# Patient Record
Sex: MALE | Race: WHITE | NOT HISPANIC OR LATINO | Employment: FULL TIME
[De-identification: names, ages, dates, MRNs, and addresses within clinical notes are randomized per-mention and may not be internally consistent; named-entity substitution may affect disease eponyms.]

---

## 2023-12-09 ENCOUNTER — HEALTH MAINTENANCE LETTER (OUTPATIENT)
Age: 33
End: 2023-12-09

## 2023-12-19 ENCOUNTER — VIRTUAL VISIT (OUTPATIENT)
Dept: ENDOCRINOLOGY | Facility: CLINIC | Age: 33
End: 2023-12-19
Payer: COMMERCIAL

## 2023-12-19 ENCOUNTER — TELEPHONE (OUTPATIENT)
Dept: ENDOCRINOLOGY | Facility: CLINIC | Age: 33
End: 2023-12-19

## 2023-12-19 VITALS — BODY MASS INDEX: 34.67 KG/M2 | HEIGHT: 72 IN | WEIGHT: 256 LBS

## 2023-12-19 DIAGNOSIS — E66.811 CLASS 1 OBESITY WITH SERIOUS COMORBIDITY AND BODY MASS INDEX (BMI) OF 34.0 TO 34.9 IN ADULT, UNSPECIFIED OBESITY TYPE: Primary | ICD-10-CM

## 2023-12-19 DIAGNOSIS — E66.811 CLASS 1 OBESITY WITH SERIOUS COMORBIDITY AND BODY MASS INDEX (BMI) OF 34.0 TO 34.9 IN ADULT, UNSPECIFIED OBESITY TYPE: ICD-10-CM

## 2023-12-19 DIAGNOSIS — E78.1 HYPERTRIGLYCERIDEMIA: ICD-10-CM

## 2023-12-19 DIAGNOSIS — Z71.3 NUTRITIONAL COUNSELING: Primary | ICD-10-CM

## 2023-12-19 PROCEDURE — 97802 MEDICAL NUTRITION INDIV IN: CPT | Mod: 95 | Performed by: DIETITIAN, REGISTERED

## 2023-12-19 PROCEDURE — 99205 OFFICE O/P NEW HI 60 MIN: CPT | Mod: 95 | Performed by: NURSE PRACTITIONER

## 2023-12-19 PROCEDURE — 99207 PR NO CHARGE LOS: CPT | Mod: 95 | Performed by: DIETITIAN, REGISTERED

## 2023-12-19 RX ORDER — BUSPIRONE HYDROCHLORIDE 15 MG/1
15 TABLET ORAL 2 TIMES DAILY
COMMUNITY
Start: 2023-02-28

## 2023-12-19 RX ORDER — SERTRALINE HYDROCHLORIDE 100 MG/1
150 TABLET, FILM COATED ORAL DAILY
COMMUNITY
Start: 2018-04-01

## 2023-12-19 ASSESSMENT — PAIN SCALES - GENERAL: PAINLEVEL: NO PAIN (0)

## 2023-12-19 NOTE — PATIENT INSTRUCTIONS
Michael Ruiz,    Follow-up with RD in 6 weeks.     Thank you,    Rossy Jackson, RD, LD  If you would like to schedule or reschedule an appointment with the RD, please call [phone]    Nutrition Goals  1) Work towards having 3 balanced meals daily (away from grazing pattern of eating at work, and more consistent daytime eating on days off).   2) Avoid eating in 2-3 hours before bed.   3) Integrate resistance exercise, 15-20 mins 2-3 times per week.      The Plate Method:  Plate method can be used for general guidance on balanced meals/portion sizes (see link below for picture/more information)                 Make 1/2 your plate non starchy vegetables (cauliflower, broccoli, asparagus, [place] sprouts, lettuce, carrots, for example).                3+ oz lean protein sources (salmon/skinless chicken/turkey breast/pork loin/lean cuts of beef/ or non-animal protein such as black, kidney or stout beans, tofu/edamame/tempeh)    (Note: 3 oz = deck of cards size)                1/2 to 1 cup carbohydrate choices such as whole grain starches or starchy vegetables or fruit. For example: quinoa, brown rice, barley, potatoes, sweet potatoes, winter squash, peas, corn, or fruit.                Choose ~0-2 added fat servings at a meal (avocados, nut butters, nuts or seeds, olive oil, vegetable oils).    [email or web address]    Protein Sources   [email or web address]     Carbohydrates  [email or web address]     Mindful Eating  [email or web address]://[email or web address]     Summary of Volumetrics Eating Plan  [email or web address]://[email or web address]     At Home Exercise Resources  ACE Fitness - Exercises by Muscle Group  https://www.acefitness.org/resources/everyone/exercise-library/    Plus Size Beginner Workout - Standing (low impact)  https://www.Visual Realmube.com/watch?v=8DyVIEe5JOM      Plus Size Full Body (created by a woman who struggled to find videos that were inclusive to larger bodies so she decided to make her own!)    https://www.Conversion Logicube.com/watch?v=6NzYT2y-lm0     Standing Core Workout for Beginners   https://www.Conversion Logicube.com/watch?v=O05y0qhhe73     Dance Workouts: The Fitness Srikanth   https://www.AllazoHealth.BuyBox/user/Mery    HIIT Workouts: PopSugar Fitness  https://www.Conversion Logicube.BuyBox/user/popsugartvfit    Pilates: Blogilates  https://www.AllazoHealth.BuyBox/user/blogilates    Yoga: Yoga with Sandrine   https://www.AllazoHealth.BuyBox/user/yogawithadriene/featured    Strength Training: HASfit  https://www.AllazoHealth.com/channel/ARTVN9-YFBCj74YW-h8LLguY    Handouts Relating to Exercise:    1.     Learning About Being Physically Active     2.      Muscle Conditionin Exercises    3.     Resistance Training with Free Weights: 3 Exercises    4.      Resistance Training with Surgical Tubin Exercises    5.     Stretchin Exercises    6.     Seated Exercises for Arms and Legs: 11 Exercises      COMPREHENSIVE WEIGHT MANAGEMENT PROGRAM  VIRTUAL SUPPORT GROUPS    At [facility], our Comprehensive Weight Management program offers on-line support groups for patients who are working on weight loss and considering, preparing for, or have had weight loss surgery.     There is no cost for this opportunity.  You are invited to attend the?Virtual Support Groups?provided by any of the following locations:    [facility] via [employer] Teams with Erum Noriega RN  2.   [place] via Strike[employer] Teams with Marques Powell, PhD, LP  3.   [place] via [employer] with Rebecca Hussein RN  4.   [facility] via a Zoom Meeting with NUVIA Smith    The following Support Group information can also be found on our website:  https://www.[email or web address].org/treatments/weight-loss-and-weight-loss-surgery-support-groups      [facility] Weight Loss Surgery Support Group  The support group is a patient-lead forum that meets monthly to share experiences, encouragement and education. It is open to those who have had weight  "loss surgery, are scheduled for surgery, or are considering surgery.   WHEN: This group meets on the 3rd Wednesday of each month from 5:00PM - 6:00PM virtually using Microsoft Teams.   FACILITATOR: Led by Erum Harrison RD, LD, RN, the program's Clinical Coordinator.   TO REGISTER: Please contact the clinic via Finexkap or call the nurse line directly at [phone] to inform our staff that you would like an invite sent to you and to let us know the email you would like the invite sent to. Prior to the meeting, a link with directions on how to join the meeting will be sent to you.    2023 and 2024 Meetings   December 20  January 17  February 21  March 20  April 17  May 15  Bernice 19      [facility] Bariatric Care Support Group?  This is open to all pre- and post- operative bariatric surgery patients as well as their support system.   WHEN: This group meets the 3rd Tuesday of each month from 6:30 PM - 8:00 PM virtually using Microsoft Teams.   FACILITATOR: Led by Marques Powell, Ph.D who is a Licensed Psychologist with the [facility] Comprehensive Weight Management Program.   TO REGISTER: Please send an email to Marques Powell, Ph.D., LP at?[email or web address]?if you would like an invitation to the group. Prior to the meeting, a link with directions on how to join the meeting will be sent to you.    2023 and 2024 Meetings  December 19 January 16: \"Medication Management and Bariatric Surgery\", Kesha Hidalgo, PharmD, Pharmacy Resident at [facility]  February 20: \"A Bariatric Surgery Patient's Perspective\", SHELLY [place], [facility], Behavioral Health Clinician at [facility]  March 19  April 16  May 21  Bernice 18: \"Nutritional Labeling\", Dietitian speaker to be announced.  November 19: \"Holiday Eating\", Dietitian speaker to be announced.    [facility] " "Post-Operative Bariatric Surgery Support Group  This is a support group for [facility] bariatric patients (and those external to [facility]) who have had bariatric surgery and are at least 3 months post-surgery.  WHEN: This support group meets the 4th Wednesday of the month from 11:00 AM - 12:00 PM virtually using Microsoft Teams.   FACILITATOR: Led by Certified Bariatric Nurse, Rebecca Hussein RN.   TO REGISTER: Please send an email to Rebecca at [email or web address] if you would like an invitation to the group.  Prior to the meeting, a link with directions on how to join the meeting will be sent to you.    2023 and 2024 Meetings  December 27  January 24  February 28  March 27  April 24  May 22  Bernice 26    [facility] Healthy Lifestyle Group?  This is a 60 minute virtual coaching group for those who want to lead a healthier lifestyle. Come together to set goals and overcome barriers in a supportive group environment. We will address the four pillars of health: nutrition, exercise, sleep and emotional well-being.  This group is highly recommended for those who are participating in the 24 week Healthy Lifestyle Plan and our Health Coaching sessions.  WHEN: This group meets the 1st Friday of the month, 12:30 PM - 1:30 PM online, via a zoom meeting.    FACILITATOR: Led by National Board Certified Health and , Rebecca Hurst [facility].   TO REGISTER: Please call the Call Center at [phone] to register.  You will get an appointment to attend in [facility]. Fifteen minutes prior to the meeting, complete the e-check in and you will get the link to join the meeting.    There is no charge to attend this group and space is limited.     2023 and 2024 Meetings  December 1: \"Let's Talk\" (guided discussion on our wins and challenges)  January 5: \"New Years Vision: Manifest your Best 2024!\" (guided imagery,  journaling and discussion)  February 2: \"Let's " "Talk\"  March 1: \"10 Percent Happier\" by Anand Gupta (Book Bites - a guided discussion on the nuggets of wisdom from favorite wellness books, no need to read the book but highly encouraged)  April 5: \"Let's Talk\"  May 3: \"Essentialism: The Disciplined Pursuit of Less\" by Weston Jang (Book Bites discussion)  June 7: \"Let's Talk\"  July 5: NO MEETING, off for the 4th of July Holiday  August 2: \"The Blue Zones, Secrets for Living a Longer Life\" by Anand Castano (Book Bites discussion)                    "

## 2023-12-19 NOTE — PROGRESS NOTES
Virtual Visit Details    Type of service:  Video Visit     Originating Location (pt. Location): Home    Distant Location (provider location):  On-site  Platform used for Video Visit: Pascual

## 2023-12-19 NOTE — PATIENT INSTRUCTIONS
"Thank you for allowing us the privilege of caring for you. We hope we provided you with the excellent service you deserve.   Please let us know if there is anything else we can do for you so that we can be sure you are completely satisfied with your care experience.    To ensure the quality of our services you may be receiving a patient satisfaction survey from an independent patient satisfaction monitoring company.    The greatest compliment you can give is a \"Likely to Recommend\"    Your visit was with Karishma Michaud CNP, and Joana Arnold PA-C today.    Instructions per today's visit:     Michael العلي, it was great to visit with you today.  Here is a review of our visit.  If our clinic scheduler is not able to reach you please call [phone] to schedule your next appointments.    Plan  Start Zepbound 2.5mg once weekly for 4 weeks, then increase to 5mg once weekly.  If zepbound denied, will prescribe saxenda as alternative   Goals we discussed today: cutting down on daily alcohol, adding weight training in each week   Labs ordered today: can get these done at any [place] lab  Follow up with Joana in 3 months   Dietician appointment today       Information about Video Visits with [facility]: video visit information  _________________________________________________________________________________________________________________________________________________________  If you are asked by your clinic team to have your blood pressure checked:  [facility] do offer several locations for blood pressure checks. Please follow the below link to schedule an appointment. Scheduling an appointment at the pharmacy for a blood pressure check is now preferred.    Appointment Plus (appointment-plus.State)  _________________________________________________________________________________________________________________________________________________________  Important contact and scheduling " information:  Please call our contact center at [phone] to schedule your next appointments.  To find a lab location near you, please call [phone].  For any nursing questions or concerns call Rosaura Grant LPN at [phone] or Dayan Scott RN at [phone]  Please call during clinic hours Monday through Friday 8:00a - 4:00p if you have questions or you can contact us via HeTextedhart at anytime and we will reply during clinic hours.    Lab results will be communicated through My Chart or letter (if My Chart not used). Please call the clinic if you have not received communication after 1 week or if you have any questions.?  Clinic Fax: [phone]    _________________________________________________________________________________________________________________________________________________________  Meal Replacement Products:    Here is the link to our new e-store where you can purchase our meal replacement products    [facility] E-Store  [employer] [place] [employer].Omnidrive/store    The one week starter kit is a great way to sample a variety of products and see what works for you.    If you want more information about the product go to: [facility]    If you are an employee or [facility] or [facility] please contact your care team for a 10% estore discount    Free Shipping for orders over $75     Benefits of meal replacements products:    Portion and calorie control  Improved nutrition  Structured eating  Simplified food choices  Avoid contact with trigger foods  _________________________________________________________________________________________________________________________________________________________  Interested in working with a health ?  Health coaches work with you to improve your overall health and wellbeing.  They look at the whole person, and may involve discussion of different areas of life, including, but not limited to the four  pillars of health (sleep, exercise, nutrition, and stress management). Discuss with your care team if you would like to start working a health .  Health Coaching-3 Pack: Schedule by calling [phone]    $99 for three health coaching visits    Visits may be done in person or via phone    Coaching is a partnership between the  and the client; Coaches do not prescribe or diagnose    Coaching helps inspire the client to reach his/her personal goals   _________________________________________________________________________________________________________________________________________________________  24 Week Healthy Lifestyle Plan:    Our mission in the 24-week Healthy Lifestyle Plan is to provide you with individualized care by giving you the tools, education and support you need to lose weight and maintain a healthy lifestyle. In your 24-week journey, you ll be supported by a dedicated weight loss team that includes registered dietitians, medical weight management providers, health coaches, and nurses -- all with special expertise in weight loss -- to help you every step of the way.     Monthly meetings with your registered dietician or medical weight management provider help to review your progress, update your care plan, and make any adjustments needed to ensure success. Between these visits, weekly and bi-weekly health  visits will help you focus on the four pillars of weight loss -- stress, sleep, nutrition, and exercise -- and how you can best adapt each to achieve sustainable weight loss results.    In addition, you will be given exclusive access to online wellbeing classes through [employer].  Your initial visit will be with a medical weight management provider who will help to understand your weight loss goals and ensure this program is the right fit for you. Please let our team know if you are interested in the 24 week plan by sending a message to your care team or calling [phone] to  schedule.  _________________________________________________________________________________________________________________________________________________________  __________  [place] [facility] Get Moving Program  Our team of physical therapists is trained to help you understand and take control of your condition. They will perform a thorough evaluation to determine your ability for activity and develop a customized plan to fit your goals and physical ability.  Scheduling: Unsure if the Get Moving program is right for you? Discuss the program with your medical provider or diabetes educator. You can also call us at [phone] to ask questions or schedule an appointment.   [facility] Get Moving Program  ____________________________________________________________________________________________________________________________________________________________________________   [employer] Diabetes Prevention Program (DPP)  If you have prediabetes and Medicare please contact us via NeXeptiont to learn more about the Diabetes Prevention Program (DPP)  Program Details:   [employer] offers the year-long Diabetes Prevention Program (DPP). The program helps you to make lifestyle changes that prevent or delay type 2 diabetes by supporting healthy eating, increased physical activity, stress reduction and use of coping skills.   On average, previous [facility] DPP cohorts have lost and maintained at least 5% of their starting weight throughout the program and averaged more than 150 minutes of physical activity per week.  Participants meet weekly for one-hour group sessions over sixteen weeks, every other week for the next 8 weeks, and monthly for the last six months.   A year-long maintenance program is also available for participants who complete the first year.   Location & Cost:   During the COVID-19 Public Health Emergency, the program is offered virtually. When in-person classes can resume, they  will be held at [facility].  For people with Medicare, the program is covered in full. A self-pay option will also be available for those with non-Medicare insurance plans.   ______________________________________________________________________________________________________________________________________________________________________________________________________________________________    To work with a Behavioral Health Psychologist:    Call to schedule:    Mario Sims - [phone]  Orlando Farris - [phone]  Amaya Cade - [phone]  Sandra Zambrano - [phone]   Haritha Acuna PhD (cannot accept Medicare) [phone]        Thank you,   [facility] Comprehensive Weight Management Team

## 2023-12-19 NOTE — TELEPHONE ENCOUNTER
PA Initiation    Medication: ZEPBOUND 2.5 MG/0.5ML SC SOAJ  Insurance Company: [employer] Non-Specialty PA's - Phone [phone] Fax [phone]  Pharmacy Filling the Rx: [facility] - [place], [place] - [address] [address]  Filling Pharmacy Phone: [phone]  Filling Pharmacy Fax: [phone]  Start Date: 12/19/2023     Thank you,     Gregg Chang [facility]  Pharmacy [facility]  [email or web address]   Phone: [phone]  Fax: [phone]

## 2023-12-19 NOTE — PROGRESS NOTES
"72 minutes spent by me on the date of the encounter doing chart review, history and exam, documentation and further activities per the note    New Medical Weight Management Consult    PATIENT:  Joseph العلي  MRN:         [id number]  :         1990  MINAL:         2023    Dear Primary Care Provider,    I had the pleasure of seeing your patient, Joseph العلي. Full intake/assessment was done to determine barriers to weight loss success and develop a treatment plan. Joseph العلي is a 33 year old male interested in treatment of medical problems associated with excess weight. He has a height of 6' 0\", a weight of 256 lbs 0 oz, and the calculated Body mass index is 34.72 kg/m .            Assessment & Plan   Problem List Items Addressed This Visit       Class 1 obesity with serious comorbidity and body mass index (BMI) of 34.0 to 34.9 in adult, unspecified obesity type - Primary    Relevant Medications    tirzepatide-Weight Management (ZEPBOUND) 2.5 MG/0.5ML prefilled pen    tirzepatide-Weight Management (ZEPBOUND) 5 MG/0.5ML prefilled pen    Other Relevant Orders    CBC with platelets    Comprehensive metabolic panel    Vitamin D Deficiency    Hypertriglyceridemia    Overweight onset in high school, weighed around 170 in high school. Weight gain has been gradual. Was able to lose from 210 to 180 around age 23 with skipping meals, but this was not sustainable and has since regained and has continued to regain to 271lbs 10 months ago which was his highest weight in life. Has since lost weight down to 156 by with diet changes but he has plateaued and has been struggling to keep up diet changes.      Regarding eating: is able to get full, is able to stay full, craves salty foods/meat/hamburgers, emotionally eats, stress eats. Sometimes will eat to the place where he feels uncomfortably full.   Has 2-3 meals a day, breakfast and lunch are small, dinner is generally really big and he will continue to " snack until bed time.     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 When not doing this job, he uses [employer] 2-3 times a week.   Goals with our clinic: manage weight sustainably to improve blood sugar, improve lipid labs.   He has not taken weight loss medication before.   Of note, his wife has seen great weight loss with wegovy, he is interested in starting a GLP-1 today as a tool for managing his cravings, his appetite, and in reducing snacking.       Plan  Start Zepbound 2.5mg once weekly for 4 weeks, then increase to 5mg once weekly.  If zepbound denied, okay to start saxenda as alternative   Goals we discussed today: cutting down on daily alcohol, adding weight training in each week   Labs ordered today: can get these done at any [facility]  Follow up with Joana in 3 months   Dietician appointment today     Medication prescribed   ZEPBOUND  Not concurrently taking a different GLP-1   No history of pancreatitis   No personal or family history of medullary thyroid carcinoma  No personal or family history of Multiple Endocrine Neoplasia Type 2  .    Alternative Medications discussed   SAXENDA  Not concurrently taking a different GLP-1   No history of pancreatitis   No personal or family history of medullary thyroid carcinoma  No personal or family history of Multiple Endocrine Neoplasia Type 2  .    TOPIRAMATE  No history of kidney stones  No history of glaucoma   No issues with memory  Caution would be needed with this medication due to depression managed by psychiatry, would need to monitor closely   .    Relatively contraindicated medications   PHENTERMINE  Issues with anxiety   History of cardiac arrhythmias remote hx of SVT and currently drinks multiple energy drinks daily, may consider with cardiology consult   .  NALTREXONE  Frequent alcohol use   .  BUPROPION  Cardiac  "arrhythmias remote hx of SVT, currently drinks multiple energy drinks daily, also is on successful mental health regimen.    Issues with anxiety, would need to speak with psychiatry   .       Joseph العلي is a 33 year old male who presents to clinic today for the following health issues.     He has the following co-morbidities:        12/19/2023    10:19 AM   --   I have the following health issues associated with obesity Pre-Diabetes   I have the following symptoms associated with obesity Depression    Back Pain    Fatigue    Hip Pain      Triglycerides 2/28/2023: 423            No data to display                    12/19/2023    10:19 AM   Referring Provider   Please name the provider who referred you to Medical Weight Management  If you do not know, please answer \"I Don't Know\" I don't know       Overweight onset in high school, weighed around 170 in high school. Weight gain has been gradual. Was able to lose from 210 to 180 around age 23 with skipping meals, but this was not sustainable and has since regained and has continued to regain to 271lbs 10 months ago which was his highest weight in life. Has since lost weight down to 156 by with diet changes but these have been unsustainable.      Was able to cut out beer to lose weight, has since started drinking on average 9 beers a week.     Goals with our clinic: manage weight sustainably to improve blood sugar, improve lipid labs.     Past/ Current AOMs: none     Wife has seen great weight loss with wegovy.         Regarding eating: is able to get full, is able to stay full, craves salty foods/meat/hamburgers, emotionally eats, stress eats. Sometimes will eat to the place where he feels uncomfortably full.     Has 2-3 meals a day, breakfast and lunch are small, dinner is generally really big and he will continue to snack until bed time.     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 Uses [employer] 2-3 times a week. Works nights as an ED tech, is " on feet for that.             12/19/2023    10:19 AM   Weight History   How concerned are you about your weight? Very Concerned   I became overweight As a Teenager   The following factors have contributed to my weight gain Mental Health Issues    After Quitting Smoking    Eating Wrong Types of Food    Eating Too Much    Stress   I have tried the following methods to lose weight Watching Portions or Calories    Exercise    Meal Replacements   My lowest weight since age 16 was 165   My highest weight since age 16 was 271   The most weight I have ever lost was (lbs) 30   I have the following family history of obesity/being overweight My mother is overweight    My father is overweight   How has your weight changed over the last year? Gained           12/19/2023    10:19 AM   Diet Recall Review with Patient   If you do eat supper, what types of food do you typically eat? Pastas. Red meat   If you do snack, what types of food do you typically eat? Breads   How many glasses of juice do you drink in a typical day? 0   How many of glasses of milk do you drink in a typical day? 0   How many 8oz glasses of sugar containing drinks such as Solo-Aid/sweet tea do you drink in a day? 0   How many cans/bottles of sugar pop/soda/tea/sports drinks do you drink in a day? 0   How many cans/bottles of diet pop/soda/tea or sports drink do you drink in a day? 0   How often do you have a drink of alcohol? Daily   If you do drink, how many drinks might you have in a day? 3-4   Eats really light breakfast and lunch, feel that he overeats for his last meal (dinner on days he doesn't work, mornings on nights he does work).         12/19/2023    10:19 AM   Eating Habits   Generally, my meals include foods like these bread, pasta, rice, potatoes, corn, crackers, sweet dessert, pop, or juice A Few Times a Week   Generally, my meals include foods like these fried meats, brats, burgers, french fries, pizza, cheese, chips, or ice cream A Few Times a  Week   Eat fast food (like McDonalds, Burger Kal, Taco Bell) Once a Week   Eat at a buffet or sit-down restaurant Less Than Weekly   Eat most of my meals in front of the TV or computer Once a Week   Often skip meals, eat at random times, have no regular eating times A Few Times a Week   Rarely sit down for a meal but snack or graze throughout A Few Times a Week   Eat extra snacks between meals A Few Times a Week   Eat most of my food at the end of the day Almost Everyday   Eat in the middle of the night or wake up at night to eat A Few Times a Week   Eat extra snacks to prevent or correct low blood sugar Never   Eat to prevent acid reflux or stomach pain Never   Worry about not having enough food to eat Never   I eat when I am depressed A Few Times a Week   I eat when I am stressed A Few Times a Week   I eat when I am bored A Few Times a Week   I eat when I am anxious Never   I eat when I am happy or as a reward A Few Times a Week   I feel hungry all the time even if I just have eaten Almost Everyday   Feeling full is important to me Everyday   I finish all the food on my plate even if I am already full Almost Everyday   I can't resist eating delicious food or walk past the good food/smell Less Than Weekly   I eat/snack without noticing that I am eating A Few Times a Week   I eat when I am preparing the meal Once a Week   I eat more than usual when I see others eating Never   I have trouble not eating sweets, ice cream, cookies, or chips if they are around the house Less Than Weekly   I think about food all day Almost Everyday   What foods, if any, do you crave? Cheese           12/19/2023    10:19 AM   Amount of Food   I feel out of control when eating Monthly   I eat a large amount of food, like a loaf of bread, a box of cookies, a pint/quart of ice cream, all at once Never   I eat a large amount of food even when I am not hungry Monthly   I eat rapidly Weekly   I eat alone because I feel embarrassed and do not  want others to see how much I have eaten Weekly   I eat until I am uncomfortably full Weekly   I feel bad, disgusted, or guilty after I overeat Almost Everyday           12/19/2023    10:19 AM   Activity/Exercise History   How much of a typical 12 hour day do you spend sitting? Less Than Half the Day   How much of a typical 12 hour day do you spend lying down? Less Than Half the Day   How much of a typical day do you spend walking/standing? Most of the Day   How many hours (not including work) do you spend on the TV/Video Games/Computer/Tablet/Phone? 2-3 Hours   How many times a week are you active for the purpose of exercise? 2-3 Times a Week   What keeps you from being more active? Lack of Time    Unsure What To Do   How many total minutes do you spend doing some activity for the purpose of exercising when you exercise? 15-30 Minutes       PAST MEDICAL HISTORY:  No past medical history on file.        12/19/2023    10:19 AM   Work/Social History Reviewed With Patient   My employment status is Part-Time   My job is EMT   How much of your job is spent on the computer or phone? 50%   How many hours do you spend commuting to work daily? 1.5   What is your marital status? /In a Relationship   If in a relationship, is your significant other overweight? No   If you have children, are they overweight? No   Who do you live with? My wife and children   Who does the food shopping? Myself and Wife       Marijuana use: used in the past, has stopped mostly for a few months. Still takes occasional gummies.    Alcohol use 9 beers daily   Caffeine use: 0 sugar energy drinks, coffee             12/19/2023    10:19 AM   Mental Health History Reviewed With Patient   Have you ever been physically or sexually abused? No   How often in the past 2 weeks have you felt little interest or pleasure in doing things? For Several Days   Over the past 2 weeks how often have you felt down, depressed, or hopeless? For Several Days   Mental  health managed by psychiatrist         12/19/2023    10:19 AM   Sleep History Reviewed With Patient   How many hours do you sleep at night? 6   Works nights, sleeps during day. Has 4 kids. Sleep isn't consistent.       MEDICATIONS:   Current Outpatient Medications   Medication Sig Dispense Refill    busPIRone (BUSPAR) 15 MG tablet Take 15 mg by mouth 2 times daily      sertraline (ZOLOFT) 100 MG tablet Take 150 mg by mouth daily      tirzepatide-Weight Management (ZEPBOUND) 2.5 MG/0.5ML prefilled pen Inject 0.5 mLs (2.5 mg) Subcutaneous every 7 days For 4 weeks 2 mL 0    tirzepatide-Weight Management (ZEPBOUND) 5 MG/0.5ML prefilled pen Inject 0.5 mLs (5 mg) Subcutaneous every 7 days After completing 2.5mg dose for 4 weeks, as long as no intolerable side effects. 2 mL 2           ALLERGIES:   No Known Allergies         No data to display                        Objective    Ht 1.829 m (6')   Wt 116.1 kg (256 lb)   BMI 34.72 kg/m    Vitals - Patient Reported  Pain Score: No Pain (0)      Vitals:  No vitals were obtained today due to virtual visit.    Physical Exam   GENERAL: Healthy, alert and no distress  EYES: Eyes grossly normal to inspection.  No discharge or erythema, or obvious scleral/conjunctival abnormalities.  RESP: No audible wheeze, cough, or visible cyanosis.  No visible retractions or increased work of breathing.    SKIN: Visible skin clear. No significant rash, abnormal pigmentation or lesions.  NEURO: Cranial nerves grossly intact.  Mentation and speech appropriate for age.  PSYCH: Mentation appears normal, affect normal/bright, judgement and insight intact, normal speech and appearance well-groomed.     Anti-obesity medication ROS:    HEENT  Hx of glaucoma: No    Cardiovascular  CAD:Yes History of SVT and tachycardia, he suspects it was related to too much caffeine use. No issues since.  HTN:No      Gastrointestinal  GERD:No  Constipation:No  Liver Dz:No  H/O Pancreatitis:No    Psychiatric  Bipolar:  No  Anxiety:Yes  Depression:Yes  History of alcohol/drug abuse: Yes History of frequent alcohol and marijauna use, has been able to cut down  Hx of eating disorder:No    Endocrine  Personal or family hx of MTC or MEN2:No  Diabetes/prediabetes: Yes prediabetes     Neurologic:  Hx of seizures: No  Hx of migraines: Yes Migraines in high school   Memory Impairment: No  CVA history: No      History of kidney stones: No  Kidney disease: No      Taking Opioid/Narcotic: No        Sincerely,    Joana Arnold PA-C

## 2023-12-19 NOTE — TELEPHONE ENCOUNTER
Prior Authorization Approval    Medication: ZEPBOUND 2.5 MG/0.5ML SC SOAJ  Authorization Effective Date: 11/19/2023  Authorization Expiration Date: 8/15/2024  Approved Dose/Quantity: 2 ml per 28 days  Reference #: [id number]   Insurance Company: [employer]ty [employer]'s - Phone [phone] Fax [phone]  Expected CoPay: $ 409.11  CoPay Card Available:      Financial Assistance Needed: Yes  Which Pharmacy is filling the prescription: [facility] - [place], [place] - [address] [facility]  Pharmacy Notified: Yes  Patient Notified: Yes - notified via [employer]. Provided savings card information.         COPAY CARD OFFERED    Medication: ZEPBOUND 2.5 MG/0.5ML SC SOAJ  Sponsor: Nichelle Moreno  Expected Copay: $ 409.11  Copay card Monthly Max Amount: $ 150  Copay card Annual Amount: $ 1,800       Thank you,     [facility]  Pharmacy Clinic [facility]  [email or web address]   Phone: [phone]  Fax: [phone]

## 2023-12-19 NOTE — LETTER
2023       RE: Joseph العلي  [address] [facility] [address]     Dear Colleague,    Thank you for referring your patient, Joseph العلي, to the [facility] [place] at [facility]. Please see a copy of my visit note below.    Video-Visit Details    Type of service:  Video Visit    Video Start Time: 1:26 PM   Video End Time: 1:58 PM    Originating Location (pt. Location): Home    Distant Location (provider location):  Offsite (providers home) [facility] [place]     Platform used for Video Visit: navigaya        New Weight Management Nutrition Consultation    Joseph العلي is a 33 year old male presents today for new weight management nutrition consultation.  Patient referred by Karishma Michaud NP on 2023.    Patient with Co-morbidities of obesity includin/19/2023    10:19 AM   --   I have the following health issues associated with obesity Pre-Diabetes   I have the following symptoms associated with obesity Depression    Back Pain    Fatigue    Hip Pain         Anthropometrics:  Estimated body mass index is 34.72 kg/m  as calculated from the following:    Height as of an earlier encounter on 23: 1.829 m (6').    Weight as of an earlier encounter on 23: 116.1 kg (256 lb).    Medications for Weight Loss:  Zepbound prescribed by ALYSSA     NUTRITION HISTORY  Food allergies: None  Food intolerances: None  Doesn't eat seafood  Vitamin/Mineral Supplements: None     Previous methods of diet modification for weight loss:  Wt gain starting in highschool. In - was only eating once daily, purging if eating large volumes, smoking. Weight was down to 170s. Realizes these were not healthy ways to lose weight. Stopped smoking in , and has noticed gradual wt gain since then.     Currently is focusing on not eating fast-food, doing well with this. Lost ~15 lbs over  last year.   Works over-nights 3-4 times weekly (7pm-7am). Sleeping 9am-2pm (5+ hrs of sleep). Days off will sleep at night.   Working on managing lipids and HgbA1c with diet/lifestyle.  Struggles with binge eating at night or right before bed. No purging.       Recent Diet Recall (work days):  While working - snacking (pretzels, mandarin oranges, chips, granola bars), ordering take-out; treats at work.    Post-work meal: large meal fried egg sandwich with hawk   Beverages: Water, coffee with small amt cream and sugar, sparkling water, SF energy drinks.  Alcohol: none on work days    Recent Diet Recall (off work):  Breakfast: bfast with kids  Lunch: skip   Snack: salty/fatty snacks (chips, fried egg)   Dinner: family meal - pasta, red meats/chicken   Snack: binge-eating after meals   Beverages: Water  Alcohol: 3-4 beers on days off   Dining Out/take-out: Once weekly - take-out/drive through           12/19/2023    10:19 AM   Diet Recall Review with Patient   If you do eat supper, what types of food do you typically eat? Pastas. Red meat   If you do snack, what types of food do you typically eat? Breads   How many glasses of juice do you drink in a typical day? 0   How many of glasses of milk do you drink in a typical day? 0   How many 8oz glasses of sugar containing drinks such as Solo-Aid/sweet tea do you drink in a day? 0   How many cans/bottles of sugar pop/soda/tea/sports drinks do you drink in a day? 0   How many cans/bottles of diet pop/soda/tea or sports drink do you drink in a day? 0   How often do you have a drink of alcohol? Daily   If you do drink, how many drinks might you have in a day? 3-4           12/19/2023    10:19 AM   Eating Habits   Generally, my meals include foods like these bread, pasta, rice, potatoes, corn, crackers, sweet dessert, pop, or juice A Few Times a Week   Generally, my meals include foods like these fried meats, brats, burgers, french fries, pizza, cheese, chips, or ice cream A  Few Times a Week   Eat fast food (like McDonalds, Burger Kal, Taco Bell) Once a Week   Eat at a buffet or sit-down restaurant Less Than Weekly   Eat most of my meals in front of the TV or computer Once a Week   Often skip meals, eat at random times, have no regular eating times A Few Times a Week   Rarely sit down for a meal but snack or graze throughout A Few Times a Week   Eat extra snacks between meals A Few Times a Week   Eat most of my food at the end of the day Almost Everyday   Eat in the middle of the night or wake up at night to eat A Few Times a Week   Eat extra snacks to prevent or correct low blood sugar Never   Eat to prevent acid reflux or stomach pain Never   Worry about not having enough food to eat Never   I eat when I am depressed A Few Times a Week   I eat when I am stressed A Few Times a Week   I eat when I am bored A Few Times a Week   I eat when I am anxious Never   I eat when I am happy or as a reward A Few Times a Week   I feel hungry all the time even if I just have eaten Almost Everyday   Feeling full is important to me Everyday   I finish all the food on my plate even if I am already full Almost Everyday   I can't resist eating delicious food or walk past the good food/smell Less Than Weekly   I eat/snack without noticing that I am eating A Few Times a Week   I eat when I am preparing the meal Once a Week   I eat more than usual when I see others eating Never   I have trouble not eating sweets, ice cream, cookies, or chips if they are around the house Less Than Weekly   I think about food all day Almost Everyday   What foods, if any, do you crave? 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  Has a Pelaton and free weights at home. Not in a routine with exercise currently.         12/19/2023    10:19 AM   Activity/Exercise History   How much of a typical 12 hour day do you spend sitting? Less Than Half the Day   How much of a typical 12 hour day do you spend lying down? Less Than Half the Day   How much of a typical day do you spend walking/standing? Most of the Day   How many hours (not including work) do you spend on the TV/Video Games/Computer/Tablet/Phone? 2-3 Hours   How many times a week are you active for the purpose of exercise? 2-3 Times a Week   What keeps you from being more active? Lack of Time    Unsure What To Do   How many total minutes do you spend doing some activity for the purpose of exercising when you exercise? 15-30 Minutes       Nutrition Prescription  Recommended energy/nutrient modification.    Nutrition Diagnosis  Obesity r/t long history of positive energy balance aeb BMI >30.    Nutrition Intervention  Materials/education provided on hypocaloric diet for weight loss. Discussed Volumetric eating to help satiety level on fewer calories; portion control and healthy food choices (Plate Method and Volumetrics handouts),  meal and snack planning and resources, and mindful eating. Encouraged increased resistance exercise and provided pt with home exercise resources. Patient demonstrates understanding.  Co-developed goals to work towards.   Provided pt with list of goals and resources below via [employer].     Expected Engagement: good  Follow-Up Plans: meal/snack planning      Nutrition Goals  1) Work towards having 3 balanced meals daily (away from grazing pattern of eating at work, and more consistent daytime eating on  days off).   2) Avoid eating in 2-3 hours before bed.   3) Integrate resistance exercise, 15-20 mins 2-3 times per week.      The Plate Method:  Plate method can be used for general guidance on balanced meals/portion sizes (see link below for picture/more information)                 Make 1/2 your plate non starchy vegetables (cauliflower, broccoli, asparagus, [place] sprouts, lettuce, carrots, for example).                3+ oz lean protein sources (salmon/skinless chicken/turkey breast/pork loin/lean cuts of beef/ or non-animal protein such as black, kidney or stout beans, tofu/edamame/tempeh)    (Note: 3 oz = deck of cards size)                1/2 to 1 cup carbohydrate choices such as whole grain starches or starchy vegetables or fruit. For example: quinoa, brown rice, barley, potatoes, sweet potatoes, winter squash, peas, corn, or fruit.                Choose ~0-2 added fat servings at a meal (avocados, nut butters, nuts or seeds, olive oil, vegetable oils).    [email or web address]    Protein Sources   http://Compass [email or web address]     Carbohydrates  [email or web address]://[email or web address]     Mindful Eating  http://Compass Labs/[email or web address]     Summary of Volumetrics Eating Plan  [email or web address]://[email or web address]     At Home Exercise Resources  ACE Fitness - Exercises by Muscle Group  https://www.acefitness.org/resources/everyone/exercise-library/    Plus Size Beginner Workout - Standing (low impact)  https://www.youSera Prognosticsube.com/watch?v=1GtIRPd3PXZ      Plus Size Full Body (created by a woman who struggled to find videos that were inclusive to larger bodies so she decided to make her own!)   https://www.[facility] Healthcare Centerube.com/watch?v=1NfSN0f-tp6     Standing Core Workout for Beginners   https://www.youSera Prognosticsube.com/watch?v=L46d9fdih57     Dance Workouts: The Fitness Srikanth   https://www.youtube.com/user/Mery    HIIT Workouts: PopSuInhabi Fitness  https://www.[facility] Healthcare Centerube.com/user/popsugartvfit    Pilates:  Blogilates  https://www.Nallatech.com/user/blogilates    Yoga: Yoga with Sandrine   https://www.Qelloube.com/user/yogawithadriene/featured    Strength Training: HASfit  https://www.Nallatech.com/channel/LRSUQ6-POSPo58BR-n1ARmwP    Handouts Relating to Exercise:    1.     Learning About Being Physically Active     2.      Muscle Conditionin Exercises    3.     Resistance Training with Free Weights: 3 Exercises    4.      Resistance Training with Surgical Tubin Exercises    5.     Stretchin Exercises    6.     Seated Exercises for Arms and Legs: 11 Exercises        Follow-Up:  6 weeks, PRN    Time spent with patient: 32 minutes.  Rossy Jackson RD, LD

## 2023-12-19 NOTE — PROGRESS NOTES
Video-Visit Details    Type of service:  Video Visit    Video Start Time: 1:26 PM   Video End Time: 1:58 PM    Originating Location (pt. Location): Home    Distant Location (provider location):  Offsite (providers home) [facility] [place]     Platform used for Video Visit: MaxLinear        New Weight Management Nutrition Consultation    Joseph العلي is a 33 year old male presents today for new weight management nutrition consultation.  Patient referred by Karishma Michaud NP on 2023.    Patient with Co-morbidities of obesity includin/19/2023    10:19 AM   --   I have the following health issues associated with obesity Pre-Diabetes   I have the following symptoms associated with obesity Depression    Back Pain    Fatigue    Hip Pain         Anthropometrics:  Estimated body mass index is 34.72 kg/m  as calculated from the following:    Height as of an earlier encounter on 23: 1.829 m (6').    Weight as of an earlier encounter on 23: 116.1 kg (256 lb).    Medications for Weight Loss:  Zepbound prescribed by ALYSSA     NUTRITION HISTORY  Food allergies: None  Food intolerances: None  Doesn't eat seafood  Vitamin/Mineral Supplements: None     Previous methods of diet modification for weight loss:  Wt gain starting in [place] [employer]. In - was only eating once daily, purging if eating large volumes, smoking. Weight was down to 170s. Realizes these were not healthy ways to lose weight. Stopped smoking in , and has noticed gradual wt gain since then.     Currently is focusing on not eating fast-food, doing well with this. Lost ~15 lbs over last year.   Works over-nights 3-4 times weekly (7pm-7am). Sleeping 9am-2pm (5+ hrs of sleep). Days off will sleep at night.   Working on managing lipids and HgbA1c with diet/lifestyle.  Struggles with binge eating at night or right before bed. No purging.       Recent Diet Recall (work days):  While working - snacking  (pretzels, mandarin oranges, chips, granola bars), ordering take-out; treats at work.    Post-work meal: large meal fried egg sandwich with hawk   Beverages: Water, coffee with small amt cream and sugar, sparkling water, SF energy drinks.  Alcohol: none on work days    Recent Diet Recall (off work):  Breakfast: bfast with kids  Lunch: skip   Snack: salty/fatty snacks (chips, fried egg)   Dinner: family meal - pasta, red meats/chicken   Snack: binge-eating after meals   Beverages: Water  Alcohol: 3-4 beers on days off   Dining Out/take-out: Once weekly - take-out/drive through           12/19/2023    10:19 AM   Diet Recall Review with Patient   If you do eat supper, what types of food do you typically eat? Pastas. Red meat   If you do snack, what types of food do you typically eat? Breads   How many glasses of juice do you drink in a typical day? 0   How many of glasses of milk do you drink in a typical day? 0   How many 8oz glasses of sugar containing drinks such as Solo-Aid/sweet tea do you drink in a day? 0   How many cans/bottles of sugar pop/soda/tea/sports drinks do you drink in a day? 0   How many cans/bottles of diet pop/soda/tea or sports drink do you drink in a day? 0   How often do you have a drink of alcohol? Daily   If you do drink, how many drinks might you have in a day? 3-4           12/19/2023    10:19 AM   Eating Habits   Generally, my meals include foods like these bread, pasta, rice, potatoes, corn, crackers, sweet dessert, pop, or juice A Few Times a Week   Generally, my meals include foods like these fried meats, brats, burgers, french fries, pizza, cheese, chips, or ice cream A Few Times a Week   Eat fast food (like McDonalds, Burger Kal, Taco Bell) Once a Week   Eat at a buffet or sit-down restaurant Less Than Weekly   Eat most of my meals in front of the TV or computer Once a Week   Often skip meals, eat at random times, have no regular eating times A Few Times a Week   Rarely sit down for  a meal but snack or graze throughout A Few Times a Week   Eat extra snacks between meals A Few Times a Week   Eat most of my food at the end of the day Almost Everyday   Eat in the middle of the night or wake up at night to eat A Few Times a Week   Eat extra snacks to prevent or correct low blood sugar Never   Eat to prevent acid reflux or stomach pain Never   Worry about not having enough food to eat Never   I eat when I am depressed A Few Times a Week   I eat when I am stressed A Few Times a Week   I eat when I am bored A Few Times a Week   I eat when I am anxious Never   I eat when I am happy or as a reward A Few Times a Week   I feel hungry all the time even if I just have eaten Almost Everyday   Feeling full is important to me Everyday   I finish all the food on my plate even if I am already full Almost Everyday   I can't resist eating delicious food or walk past the good food/smell Less Than Weekly   I eat/snack without noticing that I am eating A Few Times a Week   I eat when I am preparing the meal Once a Week   I eat more than usual when I see others eating Never   I have trouble not eating sweets, ice cream, cookies, or chips if they are around the house Less Than Weekly   I think about food all day Almost Everyday   What foods, if any, do you crave? 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  Has a Pelaton and free weights at home. Not in a  routine with exercise currently.         12/19/2023    10:19 AM   Activity/Exercise History   How much of a typical 12 hour day do you spend sitting? Less Than Half the Day   How much of a typical 12 hour day do you spend lying down? Less Than Half the Day   How much of a typical day do you spend walking/standing? Most of the Day   How many hours (not including work) do you spend on the TV/Video Games/Computer/Tablet/Phone? 2-3 Hours   How many times a week are you active for the purpose of exercise? 2-3 Times a Week   What keeps you from being more active? Lack of Time    Unsure What To Do   How many total minutes do you spend doing some activity for the purpose of exercising when you exercise? 15-30 Minutes       Nutrition Prescription  Recommended energy/nutrient modification.    Nutrition Diagnosis  Obesity r/t long history of positive energy balance aeb BMI >30.    Nutrition Intervention  Materials/education provided on hypocaloric diet for weight loss. Discussed Volumetric eating to help satiety level on fewer calories; portion control and healthy food choices (Plate Method and Volumetrics handouts),  meal and snack planning and resources, and mindful eating. Encouraged increased resistance exercise and provided pt with home exercise resources. Patient demonstrates understanding.  Co-developed goals to work towards.   Provided pt with list of goals and resources below via [employer].     Expected Engagement: good  Follow-Up Plans: meal/snack planning      Nutrition Goals  1) Work towards having 3 balanced meals daily (away from grazing pattern of eating at work, and more consistent daytime eating on days off).   2) Avoid eating in 2-3 hours before bed.   3) Integrate resistance exercise, 15-20 mins 2-3 times per week.      The Plate Method:  Plate method can be used for general guidance on balanced meals/portion sizes (see link below for picture/more information)                 Make 1/2 your plate non starchy  vegetables (cauliflower, broccoli, asparagus, [place] sprouts, lettuce, carrots, for example).                3+ oz lean protein sources (salmon/skinless chicken/turkey breast/pork loin/lean cuts of beef/ or non-animal protein such as black, kidney or stout beans, tofu/edamame/tempeh)    (Note: 3 oz = deck of cards size)                1/2 to 1 cup carbohydrate choices such as whole grain starches or starchy vegetables or fruit. For example: quinoa, brown rice, barley, potatoes, sweet potatoes, winter squash, peas, corn, or fruit.                Choose ~0-2 added fat servings at a meal (avocados, nut butters, nuts or seeds, olive oil, vegetable oils).    [email or web address]://[email or web address]    Protein Sources   http://Secret Recipe/[email or web address]     Carbohydrates  http://[email or web address]     Mindful Eating  http://Secret Recipe/754051.pdf     Summary of Volumetrics Eating Plan  http://[email or web address]     At Home Exercise Resources  ACE Fitness - Exercises by Muscle Group  https://www.acefiteDeriv Technologies.org/resources/everyone/exercise-library/    Plus Size Beginner Workout - Standing (low impact)  https://www.Syncronexube.com/watch?v=2UfZSXt6ETX      Plus Size Full Body (created by a woman who struggled to find videos that were inclusive to larger bodies so she decided to make her own!)   https://www.Syncronexube.com/watch?v=7UeGM3v-eh9     Standing Core Workout for Beginners   https://www.youtube.com/watch?v=X45a6wlyx06     Dance Workouts: The Dominguez Duke   https://www.Syncronexube.com/user/Mery    HIIT Workouts: PopSugar Fitness  https://www.Syncronexube.com/user/popsugartvfit    Pilates: Blogilates  https://www.Syncronexube.com/user/blogilates    Yoga: Yoga with Sandrine   https://www.Syncronexube.com/user/yogawithadmarty/featured    Strength Training: HASfit  https://www.youRiskIQube.com/channel/JQWLU4-ICNZg48NK-m9FYevW    Handouts Relating to Exercise:    1.     Learning About Being Physically Active     2.      Muscle Conditionin  Exercises    3.     Resistance Training with Free Weights: 3 Exercises    4.      Resistance Training with Surgical Tubin Exercises    5.     Stretchin Exercises    6.     Seated Exercises for Arms and Legs: 11 Exercises        Follow-Up:  6 weeks, PRN    Time spent with patient: 32 minutes.  Rossy Jackson RD, LD

## 2023-12-19 NOTE — NURSING NOTE
Is the patient currently in the state of [place]? YES    Visit mode:VIDEO    If the visit is dropped, the patient can be reconnected by: VIDEO VISIT: Text to cell phone:   Telephone Information:   Mobile [phone]       Will anyone else be joining the visit? NO  (If patient encounters technical issues they should call [phone]    How would you like to obtain your AVS? MyChart    Are changes needed to the allergy or medication list? No    Reason for visit: Consult    Liya ERWIN

## 2023-12-29 ENCOUNTER — TELEPHONE (OUTPATIENT)
Dept: ENDOCRINOLOGY | Facility: CLINIC | Age: 33
End: 2023-12-29
Payer: COMMERCIAL

## 2023-12-29 DIAGNOSIS — E66.811 CLASS 1 OBESITY WITH SERIOUS COMORBIDITY AND BODY MASS INDEX (BMI) OF 34.0 TO 34.9 IN ADULT, UNSPECIFIED OBESITY TYPE: Primary | ICD-10-CM

## 2023-12-29 DIAGNOSIS — E78.1 HYPERTRIGLYCERIDEMIA: ICD-10-CM

## 2023-12-29 NOTE — TELEPHONE ENCOUNTER
Prior Authorization Approval    Medication: SAXENDA 18 MG/3ML SC SOPN  Authorization Effective Date: 11/29/2023  Authorization Expiration Date: 4/27/2024  Approved Dose/Quantity: 15 ml per 30 days  Reference #: [id number]   Insurance Company: [employer] Non-Specialty PA's - Phone [phone] Fax [phone]  Expected CoPay: $ 781.08  CoPay Card Available: No  Financial Assistance Needed: N/A - no savings card avail  Which Pharmacy is filling the prescription: [facility] - [address]  Pharmacy Notified: Yes  Patient Notified: Yes - via MyChart             Thank you,     Gregg Chang [facility]  Pharmacy Clinic [facility] [email or web address]   Phone: [phone]  Fax: [phone]

## 2023-12-29 NOTE — TELEPHONE ENCOUNTER
PA Initiation    Medication: SAXENDA 18 MG/3ML SC SOPN  Insurance Company: [employer] Non-Specialty PA's - Phone [phone] Fax [phone]  Pharmacy Filling the Rx: [facility] - [place], [place] - [facility] [address]   Filling Pharmacy Phone: [phone]  Filling Pharmacy Fax: [phone]  Start Date: 12/29/2023     Thank you,     Gregg Chang [facility]  Pharmacy Clinic [facility]  [email or web address]   Phone: [phone]  Fax: [phone]

## 2024-01-02 ENCOUNTER — TELEPHONE (OUTPATIENT)
Dept: ENDOCRINOLOGY | Facility: CLINIC | Age: 34
End: 2024-01-02
Payer: COMMERCIAL

## 2024-01-02 DIAGNOSIS — E66.811 CLASS 1 OBESITY WITH SERIOUS COMORBIDITY AND BODY MASS INDEX (BMI) OF 34.0 TO 34.9 IN ADULT, UNSPECIFIED OBESITY TYPE: Primary | ICD-10-CM

## 2024-01-02 DIAGNOSIS — E78.1 HYPERTRIGLYCERIDEMIA: ICD-10-CM

## 2024-01-02 NOTE — TELEPHONE ENCOUNTER
Left Voicemail (1st Attempt) for the patient to call back and schedule the following:    Appointment type: Ret MONIMN  Provider: Rossy Jackson RD  Return date: 02/01/2024 ( 6 week follow-up )  Specialty phone number: [phone]  Additional appointment(s) needed: no  Additonal Notes: no

## 2024-04-04 ENCOUNTER — TELEPHONE (OUTPATIENT)
Dept: ENDOCRINOLOGY | Facility: CLINIC | Age: 34
End: 2024-04-04
Payer: COMMERCIAL

## 2024-04-04 NOTE — TELEPHONE ENCOUNTER
Prior Authorization Approval    Medication: WEGOVY 1 MG/0.5ML SC SOAJ  Authorization Effective Date: 3/5/2024  Authorization Expiration Date: 10/31/2024  Approved Dose/Quantity: 2 ml per 28 days  Reference #: [id number]   Insurance Company: [employer] Non-Specialty PA's - Phone [phone] Fax [phone]  Expected CoPay: $  $521.74  CoPay Card Available:      Financial Assistance Needed: No  Which Pharmacy is filling the prescription: [facility] - [place], [place] - [address] [facility]  Pharmacy Notified: Yes  Patient Notified: Yes - via [facility]. Offered savings card.    COPAY CARD OFFERED    Medication: WEGOVY 1 MG/0.5ML SC SOAJ  Sponsor: Evonne Nordisk  Expected Copay: $ 521.74  Copay card Monthly Max Amount: $ 225  Copay card Annual Amount: $ 1,800         Thank you,     Gregg Chang [facility]  Pharmacy Clinic [facility]  [email or web address]   Phone: [phone]  Fax: [phone]

## 2024-04-04 NOTE — TELEPHONE ENCOUNTER
PA Initiation    Medication: WEGOVY 1 MG/0.5ML SC SOAJ  Insurance Company: [employer] Non-Specialty PA's - Phone [phone] Fax [phone]  Pharmacy Filling the Rx: [facility] - [place], [place] - [address] [facility]  Filling Pharmacy Phone: [phone]  Filling Pharmacy Fax: [phone]  Start Date: 4/4/2024       Thank you,     Gregg Chang [facility]  Pharmacy Clinic [facility]  [email or web address]   Phone: [phone]  Fax: [phone]

## 2024-07-08 DIAGNOSIS — E66.811 CLASS 1 OBESITY WITH SERIOUS COMORBIDITY AND BODY MASS INDEX (BMI) OF 34.0 TO 34.9 IN ADULT, UNSPECIFIED OBESITY TYPE: ICD-10-CM

## 2024-07-10 RX ORDER — SEMAGLUTIDE 1 MG/.5ML
1 INJECTION, SOLUTION SUBCUTANEOUS
Qty: 2 ML | Status: CANCELLED | OUTPATIENT
Start: 2024-07-10

## 2024-07-10 NOTE — TELEPHONE ENCOUNTER
Refill denied at this time. Patient needs appointment with Karishma Michaud CNP. Immediatelyt message sent to patient to schedule appointment.

## 2024-07-10 NOTE — TELEPHONE ENCOUNTER
Semaglutide-Weight Management (WEGOVY) 1 MG/0.5ML pen   2 mL 2 4/3/2024     Last Office Visit : 12-  Future Office visit:  none    Labs completed on :     10-  Care Everywhere lab  CREATININE  0.72 - 1.25 mg/dL 0.76

## 2024-11-13 ENCOUNTER — VIRTUAL VISIT (OUTPATIENT)
Dept: ENDOCRINOLOGY | Facility: CLINIC | Age: 34
End: 2024-11-13
Payer: COMMERCIAL

## 2024-11-13 VITALS — HEIGHT: 71 IN | BODY MASS INDEX: 28.84 KG/M2 | WEIGHT: 206 LBS

## 2024-11-13 DIAGNOSIS — E66.811 CLASS 1 OBESITY WITH SERIOUS COMORBIDITY AND BODY MASS INDEX (BMI) OF 34.0 TO 34.9 IN ADULT, UNSPECIFIED OBESITY TYPE: ICD-10-CM

## 2024-11-13 DIAGNOSIS — Z86.39 HISTORY OF MORBID OBESITY: Primary | ICD-10-CM

## 2024-11-13 DIAGNOSIS — E78.1 HYPERTRIGLYCERIDEMIA: ICD-10-CM

## 2024-11-13 RX ORDER — SEMAGLUTIDE 1.7 MG/.75ML
1.7 INJECTION, SOLUTION SUBCUTANEOUS
Qty: 9 ML | Refills: 1 | Status: SHIPPED | OUTPATIENT
Start: 2024-11-13

## 2024-11-13 ASSESSMENT — PAIN SCALES - GENERAL: PAINLEVEL_OUTOF10: NO PAIN (0)

## 2024-11-13 NOTE — NURSING NOTE
Current patient location: home     Is the patient currently in the state of [place]? YES    Visit mode:VIDEO    If the visit is dropped, the patient can be reconnected by: VIDEO VISIT: Text to cell phone:   Telephone Information:   Mobile [phone]       Will anyone else be joining the visit? NO  (If patient encounters technical issues they should call [phone]    Are changes needed to the allergy or medication list? Pt stated no changes to allergies and Pt stated no med changes    Are refills needed on medications prescribed by this physician? Discuss with provider    Rooming Documentation:  Questionnaire(s) completed      Reason for visit: RECHECK    Wt other than 24 hrs:    Pain more than one location:    Serena VALDERRAMAF

## 2024-11-13 NOTE — PROGRESS NOTES
Return Medical Weight Management Note     Joseph العلي  MRN:  [id number]  :  1990  MINAL:  2024    Dear Physician No Ref-Primary,    I had the pleasure of seeing your patient Joseph العلي. He is a 34 year old male who I am continuing to see for treatment of obesity related to:        2023    10:19 AM   --   I have the following health issues associated with obesity Pre-Diabetes   I have the following symptoms associated with obesity Depression    Back Pain    Fatigue    Hip Pain       Assessment & Plan   Problem List Items Addressed This Visit          Endocrine    Hypertriglyceridemia    Relevant Medications    Semaglutide-Weight Management (WEGOVY) 1.7 MG/0.75ML pen    Other Relevant Orders    CBC with platelets    Comprehensive metabolic panel    Hemoglobin A1c    Lipid panel reflex to direct LDL Fasting    Adult [facility]ariatrics and [facility] Management Clinic Follow-Up Order    Vitamin D Deficiency       Other    History of morbid obesity - Primary     Down 19.5% total body weight since starting wegovy 2024. Tapered up to 1.7mg with good benefit, limited side effects. Continues to try to manage lifestyle with healthy eating patterns.     Insurance will no longer cover glp1 in  which was primary topic of discussion today. Discussed plan for next few months to include continue 1.7mg wegovy with taper down to every 10day injections. Will try to get 3 month supply with next fill. Will plan to transition to topiramate/ metformin when no longer able to access glp1.     Has done great with diet/ hydration since last seen. Feels good about current plan. Would like to add more activity but this is limited by schedule.     Continue wegovy 1.7mg   Reduce frequency to every 10 days if you'd like to extend time on the med  [facility] when [facility]   Follow up 3-4 months  Back up plan when no longer able to fill wegovy: metformin and topiramate  Could also consider  compounded semaglutide-          Relevant Medications    Semaglutide-Weight Management (WEGOVY) 1.7 MG/0.75ML pen    Other Relevant Orders    CBC with platelets    Comprehensive metabolic panel    Hemoglobin A1c    Lipid panel reflex to direct LDL Fasting    Adult Bariatrics and Weight Management Clinic Follow-Up Order    Vitamin D Deficiency     Other Visit Diagnoses       Class 1 obesity with serious comorbidity and body mass index (BMI) of 34.0 to 34.9 in adult, unspecified obesity type        Relevant Medications    Semaglutide-Weight Management (WEGOVY) 1.7 MG/0.75ML pen               INTERVAL HISTORY:  New [facility] 12/2023 BMI 34.7 with early onset weight gain. Comorbidities include prediabetes, depression, back pain, fatigue. Discussed starting GLp1  - some difficulties with availability and didn't get started right away     Felt best around 185lb - felt comfortable. Wants to build muscle mass     Anti-obesity medication history    Current:   Wegovy 1.7mg   -started 2/2024 - 250lb   -helpful with hunger, cravings, mindless eating, portions   -reduced alcohol cravings - drinking a couple drinks a week now   -nausea manageable as long as he remembers to eat   -more cravings towards the end of the week     Past/Failed/contraindicated:   Phentermine- hx of SVT historically, energy drinks during the day   Bupropion- hx of SVT and anxiety     Recent diet changes:   Protein shake at work   Work schedule still skews eating day   Eats as a family before work   Brings protein/ veggies to work   Smaller portions   Avoiding going too long without eating     Recent exercise/activity changes:   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   With regards to my diet, I am still struggling with: Some small cravings   I have made the following changes to my activity/exercise since my last visit: Moderate cardiac exercise   With regards to my activity/exercise, I am still struggling with: Adding more variety         MEDICATIONS:   Current Outpatient Medications   Medication Sig Dispense Refill    Semaglutide-Weight Management (WEGOVY) 1.7 MG/0.75ML pen Inject 1.7 mg subcutaneously every 7 days. 9 mL 1    busPIRone (BUSPAR) 15 MG tablet Take 15 mg by mouth 2 times daily      sertraline (ZOLOFT) 100 MG tablet Take 150 mg by mouth daily             11/10/2024    10:10 AM   Weight Loss Medication History Reviewed With Patient   Which weight loss medications are you currently taking on a regular basis? Wegovy   Are you having any side effects from the weight loss medication that we have prescribed you? No       No results found for any previous visit.            No data to display                  PHYSICAL EXAM:  Objective    Ht 1.803 m (5' 11\")   Wt 93.4 kg (206 lb)   BMI 28.73 kg/m      Vitals - Patient Reported  Pain Score: No Pain (0)        GENERAL: alert and no distress  EYES: Eyes grossly normal to inspection.  No discharge or erythema, or obvious scleral/conjunctival abnormalities.  RESP: No audible wheeze, cough, or visible cyanosis.    SKIN: Visible skin clear. No significant rash, abnormal pigmentation or lesions.  NEURO: Cranial nerves grossly intact.  Mentation and speech appropriate for age.  PSYCH: Appropriate affect, tone, and pace of words        Sincerely,    Karishma Michaud NP      30 minutes spent by me on the date of the encounter doing chart review, history and exam, documentation and further activities per the note    Virtual Visit Details    Type " of service:  Video Visit     Originating Location (pt. Location): Home    Distant Location (provider location):  Off-site  Platform used for Video Visit: Pascual

## 2024-11-13 NOTE — LETTER
2024       RE: Joseph العلي  [address]  [facility] [address]     Dear Colleague,    Thank you for referring your patient, Joseph العلي, to the [facility] [place] at [facility]. Please see a copy of my visit note below.      Return Medical Weight Management Note     Joseph العلي  MRN:  [id number]  :  1990  MINAL:  2024    Dear Physician No Ref-Primary,    I had the pleasure of seeing your patient Joseph العلي. He is a 34 year old male who I am continuing to see for treatment of obesity related to:        2023    10:19 AM   --   I have the following health issues associated with obesity Pre-Diabetes   I have the following symptoms associated with obesity Depression    Back Pain    Fatigue    Hip Pain       Assessment & Plan  Problem List Items Addressed This Visit          Endocrine    Hypertriglyceridemia    Relevant Medications    Semaglutide-Weight Management (WEGOVY) 1.7 MG/0.75ML pen    Other Relevant Orders    CBC with platelets    Comprehensive metabolic panel    Hemoglobin A1c    Lipid panel reflex to direct LDL Fasting    Adult Bariatrics and Weight Management Clinic Follow-Up Order    Vitamin D Deficiency       Other    History of morbid obesity - Primary     Down 19.5% total body weight since starting wegovy 2024. Tapered up to 1.7mg with good benefit, limited side effects. Continues to try to manage lifestyle with healthy eating patterns.     Insurance will no longer cover glp1 in  which was primary topic of discussion today. Discussed plan for next few months to include continue 1.7mg wegovy with taper down to every 10day injections. Will try to get 3 month supply with next fill. Will plan to transition to topiramate/ metformin when no longer able to access glp1.     Has done great with diet/ hydration since last seen. Feels good about current plan. Would like to add more  activity but this is limited by schedule.     Continue wegovy 1.7mg   Reduce frequency to every 10 days if you'd like to extend time on the med  Update labs when able- any [facility] location   Follow up 3-4 months  Back up plan when no longer able to fill wegovy: metformin and topiramate  Could also consider compounded semaglutide-          Relevant Medications    Semaglutide-Weight Management (WEGOVY) 1.7 MG/0.75ML pen    Other Relevant Orders    CBC with platelets    Comprehensive metabolic panel    Hemoglobin A1c    Lipid panel reflex to direct LDL Fasting    Adult Bariatrics and Weight Management Clinic Follow-Up Order    Vitamin D Deficiency     Other Visit Diagnoses       Class 1 obesity with serious comorbidity and body mass index (BMI) of 34.0 to 34.9 in adult, unspecified obesity type        Relevant Medications    Semaglutide-Weight Management (WEGOVY) 1.7 MG/0.75ML pen               INTERVAL HISTORY:  New [facility] 12/2023 BMI 34.7 with early onset weight gain. Comorbidities include prediabetes, depression, back pain, fatigue. Discussed starting GLp1  - some difficulties with availability and didn't get started right away     Felt best around 185lb - felt comfortable. Wants to build muscle mass     Anti-obesity medication history    Current:   Wegovy 1.7mg   -started 2/2024 - 250lb   -helpful with hunger, cravings, mindless eating, portions   -reduced alcohol cravings - drinking a couple drinks a week now   -nausea manageable as long as he remembers to eat   -more cravings towards the end of the week     Past/Failed/contraindicated:   Phentermine- hx of SVT historically, energy drinks during the day   Bupropion- hx of SVT and anxiety     Recent diet changes:   Protein shake at work   Work schedule still skews eating day   Eats as a family before work   Brings protein/ veggies to work   Smaller portions   Avoiding going too long without eating     Recent exercise/activity changes:   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   With regards to my diet, I am still struggling with: Some small cravings   I have made the following changes to my activity/exercise since my last visit: Moderate cardiac exercise   With regards to my activity/exercise, I am still struggling with: Adding more variety         MEDICATIONS:   Current Outpatient Medications   Medication Sig Dispense Refill     Semaglutide-Weight Management (WEGOVY) 1.7 MG/0.75ML pen Inject 1.7 mg subcutaneously every 7 days. 9 mL 1     busPIRone (BUSPAR) 15 MG tablet Take 15 mg by mouth 2 times daily       sertraline (ZOLOFT) 100 MG tablet Take 150 mg by mouth daily             11/10/2024    10:10 AM   Weight Loss Medication History Reviewed With Patient   Which weight loss medications are you currently taking on a regular basis? Wegovy   Are you having any side effects from the weight loss medication that we have prescribed you? No       No results found for any previous visit.            No data to display                  PHYSICAL EXAM:  Objective   Ht 1.803 m (5' 11\")   Wt 93.4 kg (206 lb)   BMI 28.73 kg/m      Vitals - Patient Reported  Pain Score: No Pain (0)        GENERAL: alert and no distress  EYES: Eyes grossly normal to inspection.  No discharge or erythema, or obvious scleral/conjunctival abnormalities.  RESP: No audible wheeze, cough, or visible cyanosis.    SKIN: Visible skin clear. No significant rash, abnormal pigmentation or lesions.  NEURO: Cranial nerves grossly " intact.  Mentation and speech appropriate for age.  PSYCH: Appropriate affect, tone, and pace of words        Sincerely,    Karsihma Michaud NP      30 minutes spent by me on the date of the encounter doing chart review, history and exam, documentation and further activities per the note    Virtual Visit Details    Type of service:  Video Visit     Originating Location (pt. Location): Home    Distant Location (provider location):  Off-site  Platform used for Video Visit: Pascual      Again, thank you for allowing me to participate in the care of your patient.      Sincerely,    Karishma Michaud NP

## 2024-11-13 NOTE — ASSESSMENT & PLAN NOTE
Down 19.5% total body weight since starting wegovy 2/2024. Tapered up to 1.7mg with good benefit, limited side effects. Continues to try to manage lifestyle with healthy eating patterns.     Insurance will no longer cover glp1 in 2025 which was primary topic of discussion today. Discussed plan for next few months to include continue 1.7mg wegovy with taper down to every 10day injections. Will try to get 3 month supply with next fill. Will plan to transition to topiramate/ metformin when no longer able to access glp1.     Has done great with diet/ hydration since last seen. Feels good about current plan. Would like to add more activity but this is limited by schedule.     Continue wegovy 1.7mg   Reduce frequency to every 10 days if you'd like to extend time on the med  Update labs when able- any [facility] location   Follow up 3-4 months  Back up plan when no longer able to fill wegovy: metformin and topiramate  Could also consider compounded semaglutide-

## 2024-11-13 NOTE — PATIENT INSTRUCTIONS
"Michael Ruiz, it was nice to meet you today!  Thank you for allowing us the privilege of caring for you. We hope we provided you with the excellent service you deserve.   Please let us know if there is anything else we can do for you so that we can be sure you are completely satisfied with your care experience.    To ensure the quality of our services you may be receiving a patient satisfaction survey from an independent patient satisfaction monitoring company.    The greatest compliment you can give is a \"Likely to Recommend\"    Your visit was with Karishma Michaud NP today.    Instructions per today's visit:     Follow up  plan:  Continue wegovy 1.7mg   Reduce frequency to every 10 days if you'd like to extend time on the med  Update labs when able- any [facility] location   Follow up 3-4 months  Back up plan when no longer able to fill wegovy: metformin and topiramate  Could also consider compounded semaglutide-   ___________________________________________________________________________  Important contact and scheduling information:  Please call our contact center at [phone] to schedule your next appointments.  For any nursing questions or concerns call Rosaura Grant LPN at [phone] or Dayan Scott RN at [phone]  Please call during clinic hours Monday through Friday 8:00a - 4:00p if you have questions or you can contact us via Tul[employer] at anytime and we will reply during clinic hours.    Lab results will be communicated through My Chart or letter (if My Chart not used). Please call the clinic if you have not received communication after 1 week or if you have any questions.?  Clinic Fax: [phone]  __________________________________________________________________________    If labs were ordered today:    Please make an appointment to have them drawn at your convenience.     To schedule the Lab Appointment using Tuloko:  Select \"Schedule an Appointment\"  Select \"Lab Only\"  For \"A couple of " "questions\", select \"Other\"  For \"Which locations work for you?, select the location and set up the appointment    To schedule by phone call [phone] to schedule a lab only appointment at any [facility] lab.  ___________________________________________________________________________  Work with A [facility] !  Virtual Sessions are Available through [facility] Weight Management Clinics    To learn more, call to schedule a free, Health  Q&A appointment: [phone]     What is Health Coaching?  Do you know what you are supposed to do, but you just aren't doing it?  Then, HEALTH COACHING may help you!   Get unstuck and move forward with the support of a professionally trained NBC-HWC (National Board-Certified Health and ) who uses evidence-based approaches to help you move forward with healthy lifestyle changes in the areas of weight loss, stress management and overall well-being.    Health Coaches help you identify goals that will work best for you. Health Coaches provide support and encouragement with overcoming barriers and help you to find inspiration and motivation to lead a healthy lifestyle.    Option one:  Health Coaching 3-Pack; Three, 30-minute Health Coaching Visits, for $99  Visits are done virtually (phone or video)  This is a self pay service; we do not accept insurance for alina coaching.    Option two:   The 24 week Plan; 11 Health Coaching Visits, and a 7 months subscription to [employer]-- on-demand fitness, nutrition and mindfulness classes, for $499 (employee discounts may be available). Participants will also meet regularly with a weight management Medical Provider and a Registered/Licensed Dietician.  This is a self-pay service; we do not accept insurance for health coaching.    To Schedule a free Health  Q&A appointment to learn more,  call [phone].  ____________________________________________________________________  [facility]" "[facility]  Healthy Lifestyle Group    Healthy Lifestyle Group  This is a 60 minute virtual coaching group for those who want to lead a healthier lifestyle. Come together to set goals and overcome barriers in a supportive group environment. We will address the four pillars of health--nutrition, exercise, sleep and emotional well-being.  This group is highly recommended for those who are participating in the 24 week Healthy Lifestyle Plan and our Health Coaching sessions.    WHEN: This group meets the first Friday of the month, 12:30 PM - 1:30 PM online, via a zoom meeting.      FACILITATOR: Led by National Board Certified Health and , Rebecca Hurst [facility].    TO REGISTER: Please call the Call Center at [phone] to register. You will get an appointment to attend in [employer]. Fifteen minutes prior to the meeting, complete the e-check in and you will get the link to join the meeting.  There is no charge to attend this group and space is limited.      2023 and 2024 Meeting Topics and Dates:    November 3: Introduction to Mindfulness (Learn simple and effective mindfulness practices and how it can benefit you)    December 8: Let's Talk (guided discussion on our wins and challenges)    January 5: New Years Vision: Manifest your Best 2024! (Guided imagery,  journaling and discussion)    February 2: Let's Talk    March 1: 10 Percent Happier by Anand Gupta (Book Bites; a guided discussion on the nuggets of wisdom from favorite wellness books; no need to read the book but highly encouraged)    April 5: Let's Talk    May 3: \"Essentialism; The Disciplined Pursuit of Less by Weston Narvaez (book bites discussion)    June 7: Let's Talk    July 5: NO MEETING, off for the 4th of July Holiday    August 2: The Blue Zones, Secrets for Living a Longer Life by Anand Castano (book bites discussion)      If you would like bariatric surgery specific support group info please let your care team know.     TOPIRAMATE " (TOPAMAX)    We are considering starting topiramate at bedtime.  Start one tab, 25 mg, for a week. Go up to 50 mg (2 tabs) for the next week. At the third week, take  3 tabs (75 mg).  Stay at 3 tabs until you are seen again.       Topiramate (Topamax) is a medication that is used most often to treat migraine headaches or for seizures. It has also been found to help with weight loss. Although it's not currently FDA approved for weight loss, it has been used safely for a number of years to help people who are carrying extra weight.     Just how topiramate helps with weight loss has not been exactly determined. However it seems to work on areas of the brain to quiet down signals related to eating.      Topiramate may make you:    >feel less interest in eating in between meals   >think less about food and eating   >find it easier to push the plate away   >find giving up pop easier    >have an easier time eating less    For some of our patients, the pills work right away. They feel and think quite differently about food. Other patients don't feel much of a change but find in fact they have lost weight! Like all weight loss medications, topiramate works best when you help it work.  This means:    1) Have less tempting high calorie (fattening) food around the house or office    2) Have lower calorie food (fruits, vegetables,low fat meats and dairy) for snacks    3) Eat out only one time or less each week.   4) Eat your meals at a table with the TV or computer off.    Side-effects. Topiramate is generally well tolerated. The main side-effects we see are:   Tingling in hands,feet, or face (usually not very troublesome)   Mental confusion and word finding trouble (about 10% of patients have this.)     Feeling sleepy or a bit dopey- this goes away very soon after starting.    One of the dangers of topiramate is the possibility of birth defects--if you get pregnant when you are on it, there is the risk that your baby will be  born with a cleft lip or palate.  If you are on topiramate and of child bearing age, you need to be on a reliable form of birth control or refrain from sexual intercourse.     Please refer to the pharmacy insert for more information on side-effects. Since many pharmacists are not familiar with the use of topiramate in weight loss, calling the clinic will get you the most accurate information on the use of this medication for weight loss.    For any questions or concerns please send a [employer] message to our team or call our weight management call center at [phone] during regular business hours. For questions during evenings or weekends your messages will be addressed during the next business day.  For emergencies please call 911 or seek immediate medical care.     (Do not stop taking it if you don't think it's working. For some people it works even though they do not feel much different.)     In order to get refills of this or any medication we prescribe you must be seen in the medical weight mgmt clinic every 3-6 months. Please have your pharmacy fax a refill request to [phone].  METFORMIN    We are considering starting metformin. Starting instructions:    Immediate release tablet: Take 1 tablet by mouth daily with a meal for 1 week, then increase to 1 tablet twice daily with meals.   Extended release tablet: Take 1 tablet by mouth daily with a meal for 1 week, then increase to 2 tablets daily with a meal or 1 tablet twice daily with meals.      Metformin is a medication that is used to assist with lowering blood sugars in patients that have Pre-Diabetes or Diabetes. It has also been found to help with weight loss.    Metformin helps to lower blood sugars by lowering the amount of sugar (glucose) your liver produces that would otherwise cause your blood sugar to increase. It also makes your body respond better to insulin (the product that lowers your blood sugar). It is unclear how metformin assists with  weight loss.     Side-effects. Metformin is generally well tolerated. The main side-effects we see are: Diarrhea, nausea and stomach upset - this tends to subside over time as your body gets  used to the medication. Taking this medications with food will lower these side effects.    Low blood sugar (hypoglycemia) is rare to occur with metformin, but can occur if you do not eat enough or are taking other medications that assist to lower blood sugar. The signs of low blood sugar are:  Weakness  Shaky   Hungry  Sweating  Confusion      See below for ways to treat low blood sugar without adding in lots of extra calories.    Treating Low Blood Sugar    If you have symptoms of low blood sugar (sweating, shaking, dizzy, confused) eat 15 grams of carbs and wait 15 minutes:    Glucose Tabs are best for sugars under 70 -  Dex4 or BD Glucose tablets are good, you will need to take 3-4 of these to equal 15 grams.     One small box of raisins  4 oz fruit juice box or   cup fruit juice  1 small apple  1 small banana    cup canned fruit in water    English muffin or a slice of bread with jelly   1 low fat frozen waffle with sugar-free syrup    cup cottage cheese with   cup frozen or fresh blueberries  1 cup skim or low-fat milk    cup whole grain cereal  4-6 crackers such as Triscuits      Please refer to the pharmacy insert for more information on side-effects. Please call the clinic should you have more questions regarding this medication.     For any questions or concerns please send a [employer] message to our team or call our weight management call center at [phone] during regular business hours. For questions during evenings or weekends your messages will be addressed during the next business day.  For emergencies please call 911 or seek immediate medical care.      In order to get refills of this or any medication we prescribe you must be seen in the medical weight mgmt clinic every 2-3 months. Please have your pharmacy  fax a refill request to [phone].  Compound Semaglutide at [facility]  [facility] is now offering compounded semaglutide during the time of Wegovy national shortage/limited supply. Semaglutide is the generic name of Wegovy. [facility] is following the highest standards for sterility and compounding practices. Not all compounding practices are equal. Therefore, [facility] will not be prescribing compounded semaglutide outside of the [facility]. Compounding of semaglutide is legal for as long as Wegovy is on the FDA's national shortage list. When/if Wegovy is taken off the FDA's shortage list, compounded semaglutide will no longer be legal to manufacture. When this occurs, patients will have to turn to acquiring Wegovy via its available manufactured pen, look into alternative weight loss medication(s), or stop the medication. Compound semaglutide will be available as a pre-filled syringe. Due to high demand of compounded semaglutide, orders may take 1-2 weeks to obtain from time of prescribing. Each dose of the medication will require a separate prescription.     As with any weight loss medication(s), there is a risk of weight regain should you stop semaglutide. It is important to be aware of this risk should you stop compounded semaglutide with no plans to transition back to an alternative injectable option as the use of semaglutide is intended for long term weight management with the intention of remaining on this injectable long term.        Obtaining Medication and Storage:   The pharmacy must speak to the patient directly prior to shipping medication to walk through administration, shipping and cost. Vials of compounded semaglutide and unit syringes will be mailed from the compounding pharmacy to your home in a refrigerated box. The vial you will be given is a multi-use vial, meaning that you will use the same vial during the  "next 28 days for each of your injections. Use a new syringe for each injection. The syringe that will be used to draw up your dose is a \"unit\" syringe, meaning your dose will have an associated \"mg\" and \"units\". Please see your prescription and the below information to verify the dose you should be injecting in UNITS prior to doing your injection. Keep your medication stored in the refrigerator. The vials are to be discarded 28 days after opening (even if there is remaining medication in the vial).     Do not pre-fill syringes from the multi-use vial for future use. Sterility cannot be verified. You should only be drawing up the amount needed for the injection that day, then placing vial back into refrigerator for storage for next injection.     Common Side Effects:  Side effect profile is the same as Wegovy. Monitor for nausea, diarrhea, constipation, headache, indigestion, tiredness (fatigue), stomach upset or abdominal pain. Less commonly, semaglutide can cause low blood sugar (symptoms: shaky, dizzy, sweaty, agitation). Please reach out to the care team should you feel like this is occurring. It is important to ensure that you are eating consistent meals and not skipping meals. Ensure you are getting at least 64 oz water daily. If any side effects become unmanageable, contact the care team immediately.    Dosing:  Each week you will have the opportunity/option to increase your dose. However, therapy is individualized for each patient. The below is a general guide should someone increase dosage of compound semaglutide each month.   Week 1-4: Inject 0.25 mg (meaning 5 units) subcutaneously once weekly  Week 5-8: If tolerating, increase to 0.5 mg (meaning 10 units) once weekly  Week 9-12: If tolerating, increase to 1 mg (meaning 20 units) once weekly  Week 13-15: If tolerating, increase to 1.5 mg (meaning 30 units) OR 1.7 mg (meaning 34 units) once weekly (dosing depending on what was prescribed by provider)  Week " 16-19: If tolerating, increase to 2 mg (meaning 40 units) once weekly  Week 20 & on: If tolerating, increase to 2.4 mg (meaning 48 units) or 2.5 mg (meaning 50 units) once weekly (dosing depending on what was prescribed by provider)  *If you are having some nausea or other side effects to where you are hesitant to move up to the next dose, stay at the same dose you are on for an additional 4 weeks to see if side effect(s) improves/resolves. Make sure to take this time to hydrate and utilizing smaller more consistent meals, such as 4-6 small meals per day.  It may be advantageous to stay at the same dose if you are seeing good efficacy (both on and off the scale) and having minimal/manageable side effects. If you do not have additional refills on that dose's prescription, please reach out to the clinic.    Mg to Unit Conversion Chart for Reference:         Administration:  Follow the instructions to fill the syringe with medicine. Instructions can be accessed at [email or web address] or by scanning this QR code-    Follow the instructions to inject your medication. Instructions can be accessed at [email or web address]  or by scanning this QR code-      Syringe Disposal:   Place the used syringe in a sharps container. You can buy a sharps container at your local pharmacy.   If you don't have a sharps container, you can use a plastic detergent container with a lid.   Visit Learning About Safe Needle Use and Disposal (healthwise.net) and safeneedledisposal.org for more information.     Cost:   $230 per month for doses 1 mg or less (one 1 mL vial), $370 per month for doses higher than 1 mg (two 1 mL vials)     [facility] Phone:  [phone]        Thank you,   [facility] Comprehensive Weight Management Team                          There are no Wet Read(s) to document.

## 2024-11-25 ENCOUNTER — TELEPHONE (OUTPATIENT)
Dept: ENDOCRINOLOGY | Facility: CLINIC | Age: 34
End: 2024-11-25
Payer: COMMERCIAL

## 2024-11-25 ENCOUNTER — DOCUMENTATION ONLY (OUTPATIENT)
Dept: ENDOCRINOLOGY | Facility: CLINIC | Age: 34
End: 2024-11-25
Payer: COMMERCIAL

## 2024-11-25 NOTE — TELEPHONE ENCOUNTER
Patient confirmed scheduled appointment:     Date: 5/1/25  Time: 3 PM  Visit type: Return Weight Management  Visit mode: Virtual Visit  Provider:  Karishma Michaud NP  Location: [facility] [place]    Additional Notes: Pt confirmed will be in [place] for appt; pt requested that lab orders be sent to his primary care clinic at [facility] in [place].

## 2025-01-11 ENCOUNTER — HEALTH MAINTENANCE LETTER (OUTPATIENT)
Age: 35
End: 2025-01-11

## 2025-05-01 ENCOUNTER — VIRTUAL VISIT (OUTPATIENT)
Dept: ENDOCRINOLOGY | Facility: CLINIC | Age: 35
End: 2025-05-01
Attending: NURSE PRACTITIONER
Payer: COMMERCIAL

## 2025-05-01 VITALS — HEIGHT: 71 IN | WEIGHT: 189 LBS | BODY MASS INDEX: 26.46 KG/M2

## 2025-05-01 DIAGNOSIS — E78.1 HYPERTRIGLYCERIDEMIA: ICD-10-CM

## 2025-05-01 DIAGNOSIS — Z86.39 HISTORY OF MORBID OBESITY: Primary | ICD-10-CM

## 2025-05-01 RX ORDER — TOPIRAMATE 25 MG/1
25 TABLET, FILM COATED ORAL 2 TIMES DAILY
Qty: 60 TABLET | Refills: 3 | Status: SHIPPED | OUTPATIENT
Start: 2025-05-01

## 2025-05-01 RX ORDER — PHENTERMINE HYDROCHLORIDE 37.5 MG/1
0.5 TABLET ORAL EVERY MORNING
Qty: 15 TABLET | Refills: 3 | Status: SHIPPED | OUTPATIENT
Start: 2025-05-01

## 2025-05-01 NOTE — PATIENT INSTRUCTIONS
Try phentermine 1/2 tab daily   Try adding topiramate 25mg twice daily   Great work in being mindful of nutrition/ hydration   Great work with activity   Consider pausing wegovy 1.7mg when you run out   Follow up 3 months

## 2025-05-01 NOTE — LETTER
2025       RE: Joseph العلي  [address]  [facility] [address]     Dear Colleague,    Thank you for referring your patient, Joseph العلي, to the [facility]. Please see a copy of my visit note below.      Return Medical Weight Management Note     Joseph العلي  MRN:  [id number]  :  1990  MINAL:  2025    Dear Keith Bourgeois MD,    I had the pleasure of seeing your patient Joseph العلي. He is a 34 year old male who I am continuing to see for treatment of obesity related to:        2023    10:19 AM   --   I have the following health issues associated with obesity Pre-Diabetes   I have the following symptoms associated with obesity Depression    Back Pain    Fatigue    Hip Pain       Assessment & Plan  Problem List Items Addressed This Visit          Endocrine    Hypertriglyceridemia       Other    History of morbid obesity - Primary     Insurance not covering glp1 this year. Has been reducing frequency of injections to spread out coverage. Has been doing wegovy every 3-4 weeks with longer gaps in between but then will have quite a bit of side effects after injections. He has a few injections of the 1.7mg dose and wonders what to do next. We discussed SL semaglutide, oral  antiobesity medications and vials from [employer]. We will start phentermine/ topiramate now while he tapers off semaglutide. Could also consider bupropion/ naltrexone.    Had previously avoided phentermine due to hx of SVT but has not had episodes of tachycardia recently, blood pressure and HR normal. Anxiety well controlled. Discussed concerning symptoms to monitor for.     Try phentermine 1/2 tab daily   Try adding topiramate 25mg twice daily   Great work in being mindful of nutrition/ hydration   Great work with activity   Consider pausing wegovy 1.7mg when you run out   Follow up 3 months          Relevant  Medications    phentermine (ADIPEX-P) 37.5 MG tablet    topiramate (TOPAMAX) 25 MG tablet          INTERVAL HISTORY:  New [facility] 12/2023 BMI 34.7 with early onset weight gain. Comorbidities include prediabetes, depression, back pain, fatigue. Discussed starting GLp1  - some difficulties with availability and didn't get started right away      Last seen 11/2024- Felt best around 185lb - felt comfortable. Wants to build muscle mass, continue wegovy 1.7     Was hoping to get to 180lb so still hoping to lose a little bit     Anti-obesity medication history    Current:   Wegovy 1.7mg - taking every 3-4 weeks- as cravings increasing     Past/Failed/contraindicated:   Previously avoided Phentermine- hx of SVT historically, energy drinks during the day - currently blood pressure/ pulse normal   Bupropion- hx of SVT and anxiety     Recent diet changes:   Really mindful of appetite/ cravings and food intake - takes a lot of effort   Stopped alcohol     Recent stressors:   Anxiety and depression stable     Recent sleep changes:   Working nights   School during the day       Vitamins/Labs: 3/2025 care everywhere     CURRENT WEIGHT:   189 lbs 0 oz    Initial Weight (lbs): 256 lbs  Last Visits Weight: 93.4 kg (206 lb)  Cumulative weight loss (lbs): 67  Weight Loss Percentage: 26.17%        5/1/2025     2:54 PM   Changes and Difficulties   I have made the following changes to my diet since my last visit: no   With regards to my diet, I am still struggling with: ocassional food cravings   I have made the following changes to my activity/exercise since my last visit: more walking   With regards to my activity/exercise, I am still struggling with: no         MEDICATIONS:   Current Outpatient Medications   Medication Sig Dispense Refill     phentermine (ADIPEX-P) 37.5 MG tablet Take 0.5 tablets (18.75 mg) by mouth every morning. 15 tablet 3     Semaglutide-Weight Management (WEGOVY) 1.7 MG/0.75ML pen Inject 1.7 mg subcutaneously every 7  "days. 9 mL 1     topiramate (TOPAMAX) 25 MG tablet Take 1 tablet (25 mg) by mouth 2 times daily. 25mg at bedtime for week 1 then increase to 25mg twice daily 60 tablet 3     busPIRone (BUSPAR) 15 MG tablet Take 15 mg by mouth 2 times daily       Semaglutide-Weight Management (WEGOVY) 2.4 MG/0.75ML pen Inject 2.4 mg subcutaneously every 7 days. 9 mL 0     sertraline (ZOLOFT) 100 MG tablet Take 150 mg by mouth daily             5/1/2025     2:54 PM   Weight Loss Medication History Reviewed With Patient   Which weight loss medications are you currently taking on a regular basis? Wegovy   Are you having any side effects from the weight loss medication that we have prescribed you? No       No results found for any previous visit.            No data to display                  PHYSICAL EXAM:  Objective   Ht 1.803 m (5' 10.98\")   Wt 85.7 kg (189 lb)   BMI 26.37 kg/m             Vitals:  No vitals were obtained today due to virtual visit.    GENERAL: alert and no distress  EYES: Eyes grossly normal to inspection.  No discharge or erythema, or obvious scleral/conjunctival abnormalities.  RESP: No audible wheeze, cough, or visible cyanosis.    SKIN: Visible skin clear. No significant rash, abnormal pigmentation or lesions.  NEURO: Cranial nerves grossly intact.  Mentation and speech appropriate for age.  PSYCH: Appropriate affect, tone, and pace of words        Sincerely,    Karishma Michaud NP      30 minutes spent by me on the date of the encounter doing chart review, history and exam, documentation and further activities per the note    The longitudinal plan of care for the diagnosis(es)/condition(s) as documented were addressed during this visit. Due to the added complexity in care, I will continue to support Joseph in the subsequent management and with ongoing continuity of care.    Virtual Visit Details    Type of service:  Video Visit     Originating Location (pt. Location): Home    Distant Location (provider location):  " Off-site  Platform used for Video Visit: Pascual          Again, thank you for allowing me to participate in the care of your patient.      Sincerely,    Karishma Michaud NP

## 2025-05-01 NOTE — NURSING NOTE
Current patient location: [address] [facility] [address]    Is the patient currently in the state of [place]? YES    Visit mode:VIDEO    If the visit is dropped, the patient can be reconnected by: VIDEO VISIT: Text to cell phone:   Telephone Information:   Mobile [phone]       Will anyone else be joining the visit? NO  (If patient encounters technical issues they should call [phone]    Are changes needed to the allergy or medication list? No    Are refills needed on medications prescribed by this physician? NO    Reason for visit: RECHECK    Gisel ERWIN

## 2025-05-02 NOTE — ASSESSMENT & PLAN NOTE
Insurance not covering glp1 this year. Has been reducing frequency of injections to spread out coverage. Has been doing wegovy every 3-4 weeks with longer gaps in between but then will have quite a bit of side effects after injections. He has a few injections of the 1.7mg dose and wonders what to do next. We discussed SL semaglutide, oral  antiobesity medications and vials from [employer]. We will start phentermine/ topiramate now while he tapers off semaglutide. Could also consider bupropion/ naltrexone.    Had previously avoided phentermine due to hx of SVT but has not had episodes of tachycardia recently, blood pressure and HR normal. Anxiety well controlled. Discussed concerning symptoms to monitor for.     Try phentermine 1/2 tab daily   Try adding topiramate 25mg twice daily   Great work in being mindful of nutrition/ hydration   Great work with activity   Consider pausing wegovy 1.7mg when you run out   Follow up 3 months

## 2025-05-05 ENCOUNTER — TELEPHONE (OUTPATIENT)
Dept: ENDOCRINOLOGY | Facility: CLINIC | Age: 35
End: 2025-05-05
Payer: COMMERCIAL

## 2025-05-05 NOTE — TELEPHONE ENCOUNTER
"Prior Authorization Retail Medication Request    Medication/Dose: Phentermine  Diagnosis and ICD code (if different than what is on RX):  History of morbid obesity [Z86.39]  - Primary     New/renewal/insurance change PA/secondary ins. PA:  Previously Tried and Failed:  Wegovy, history of diet and exercise, Bupropion  Rationale:  I had the pleasure of seeing your patient Joseph العلي. He is a 34 year old male who I am continuing to see for treatment of obesity related to: pre-diabetes.     Estimated body mass index is 26.37 kg/m  as calculated from the following:    Height as of 5/1/25: 1.803 m (5' 10.98\").    Weight as of 5/1/25: 85.7 kg (189 lb).    Insurance not covering glp1 this year. Has been reducing frequency of injections to spread out coverage. Has been doing wegovy every 3-4 weeks with longer gaps in between but then will have quite a bit of side effects after injections. He has a few injections of the 1.7mg dose and wonders what to do next. We discussed SL semaglutide, oral  antiobesity medications and vials from [employer]. We will start phentermine/ topiramate now while he tapers off semaglutide. Could also consider bupropion/ naltrexone.     Had previously avoided phentermine due to hx of SVT but has not had episodes of tachycardia recently, blood pressure and HR normal. Anxiety well controlled. Discussed concerning symptoms to monitor for.       Insurance   Primary:   Insurance ID:      Secondary (if applicable):  Insurance ID:      Pharmacy Information (if different than what is on RX)  Name:    [email or web address] [place], [place] - [address] [facility]     Phone:  [phone]   Fax: [phone]     Clinic Information  Preferred routing pool for dept communication: S nurse   "

## 2025-05-06 ENCOUNTER — TELEPHONE (OUTPATIENT)
Dept: ENDOCRINOLOGY | Facility: CLINIC | Age: 35
End: 2025-05-06
Payer: COMMERCIAL

## 2025-05-06 NOTE — TELEPHONE ENCOUNTER
Left Voicemail (1st Attempt) and Sent Mychart (1st Attempt) for the patient to call back and schedule the following:    Appointment type: [facility]  Provider: Karishma Michaud  Return date: around September  Specialty phone number: [phone]  Additional appointment(s) needed: n/a  Additonal Notes: n/a

## 2025-05-08 NOTE — TELEPHONE ENCOUNTER
PRIOR AUTHORIZATION DENIED    Medication: PHENTERMINE HCL 37.5 MG PO TABS  Insurance Company: [employer]-Specialty [employer]'s - Phone [phone] Fax [phone]  Denial Date: 5/8/2025  Denial Reason(s): Did not meet BMI criteria      Appeal Information:       Patient Notified: No